# Patient Record
Sex: MALE | Race: WHITE | Employment: FULL TIME | URBAN - METROPOLITAN AREA
[De-identification: names, ages, dates, MRNs, and addresses within clinical notes are randomized per-mention and may not be internally consistent; named-entity substitution may affect disease eponyms.]

---

## 2022-11-19 ENCOUNTER — HOSPITAL ENCOUNTER (EMERGENCY)
Age: 32
Discharge: HOME | End: 2022-11-19
Attending: EMERGENCY MEDICINE
Payer: COMMERCIAL

## 2022-11-19 ENCOUNTER — APPOINTMENT (OUTPATIENT)
Dept: GENERAL RADIOLOGY | Age: 32
End: 2022-11-19
Payer: COMMERCIAL

## 2022-11-19 VITALS
WEIGHT: 180 LBS | OXYGEN SATURATION: 100 % | BODY MASS INDEX: 25.77 KG/M2 | SYSTOLIC BLOOD PRESSURE: 106 MMHG | DIASTOLIC BLOOD PRESSURE: 61 MMHG | HEART RATE: 51 BPM | RESPIRATION RATE: 14 BRPM | HEIGHT: 70 IN | TEMPERATURE: 97.7 F

## 2022-11-19 DIAGNOSIS — S39.012A STRAIN OF LUMBAR REGION, INITIAL ENCOUNTER: Primary | ICD-10-CM

## 2022-11-19 PROCEDURE — 6370000000 HC RX 637 (ALT 250 FOR IP): Performed by: EMERGENCY MEDICINE

## 2022-11-19 PROCEDURE — 72100 X-RAY EXAM L-S SPINE 2/3 VWS: CPT

## 2022-11-19 PROCEDURE — 96374 THER/PROPH/DIAG INJ IV PUSH: CPT

## 2022-11-19 PROCEDURE — 99284 EMERGENCY DEPT VISIT MOD MDM: CPT

## 2022-11-19 PROCEDURE — 6360000002 HC RX W HCPCS: Performed by: EMERGENCY MEDICINE

## 2022-11-19 RX ORDER — KETOROLAC TROMETHAMINE 30 MG/ML
30 INJECTION, SOLUTION INTRAMUSCULAR; INTRAVENOUS ONCE
Status: COMPLETED | OUTPATIENT
Start: 2022-11-19 | End: 2022-11-19

## 2022-11-19 RX ORDER — METHYLPREDNISOLONE 4 MG/1
TABLET ORAL
Qty: 1 KIT | Refills: 0 | Status: SHIPPED | OUTPATIENT
Start: 2022-11-19

## 2022-11-19 RX ORDER — IBUPROFEN 800 MG/1
800 TABLET ORAL EVERY 8 HOURS PRN
Qty: 21 TABLET | Refills: 0 | Status: SHIPPED | OUTPATIENT
Start: 2022-11-19 | End: 2022-11-26

## 2022-11-19 RX ORDER — CYCLOBENZAPRINE HCL 10 MG
10 TABLET ORAL ONCE
Status: COMPLETED | OUTPATIENT
Start: 2022-11-19 | End: 2022-11-19

## 2022-11-19 RX ORDER — LIDOCAINE 4 G/G
1 PATCH TOPICAL ONCE
Status: DISCONTINUED | OUTPATIENT
Start: 2022-11-19 | End: 2022-11-19 | Stop reason: HOSPADM

## 2022-11-19 RX ORDER — CYCLOBENZAPRINE HCL 10 MG
10 TABLET ORAL 3 TIMES DAILY PRN
Qty: 21 TABLET | Refills: 0 | Status: SHIPPED | OUTPATIENT
Start: 2022-11-19 | End: 2022-11-26

## 2022-11-19 RX ORDER — LIDOCAINE 50 MG/G
1 PATCH TOPICAL DAILY
Qty: 10 PATCH | Refills: 0 | Status: SHIPPED | OUTPATIENT
Start: 2022-11-19 | End: 2022-11-29

## 2022-11-19 RX ADMIN — CYCLOBENZAPRINE 10 MG: 10 TABLET, FILM COATED ORAL at 05:27

## 2022-11-19 RX ADMIN — KETOROLAC TROMETHAMINE 30 MG: 30 INJECTION, SOLUTION INTRAMUSCULAR; INTRAVENOUS at 05:27

## 2022-11-19 ASSESSMENT — PAIN SCALES - GENERAL
PAINLEVEL_OUTOF10: 5
PAINLEVEL_OUTOF10: 5

## 2022-11-19 ASSESSMENT — ENCOUNTER SYMPTOMS
BACK PAIN: 1
ABDOMINAL PAIN: 0

## 2022-11-19 ASSESSMENT — PAIN - FUNCTIONAL ASSESSMENT: PAIN_FUNCTIONAL_ASSESSMENT: 0-10

## 2022-11-19 NOTE — ED PROVIDER NOTES
Emergency Department Provider Note  Location: [address] [place] [address]  ED  11/19/2022     Patient Identification  Artemio Landaverde is a 27 y.o. male    Chief Complaint  Back Pain (Lower back pain \"forever\"  states wife made him come in because she is tired of listening to him groan. Pt has not ever taken anything for pain )      Mode of Arrival  private car    HPI  (History provided by patient)  This is a 27 y.o. male without relevant past medical history presented today for low back pain. Patient reports for several months, he has developed a low back pain. He described as a sharp pain in the lower thoracic to lower lumbar area that does not radiate. The pain is usually worse in the middle of the night or early morning when he first gets up. Once he gets up and move around, pain eases up. He has not taken any medicine for the pain. He denies fever. No dysuria urinary frequency or hematuria. No bowel or urinary retention or incontinence. No saddle paresthesia. Patient has not seen a healthcare provider for this. Wife got tired of listening to him groan in the middle of the night/early morning hours so she insisted that he gets evaluated tonight. ROS  Review of Systems   Constitutional:  Negative for chills and fever. Gastrointestinal:  Negative for abdominal pain. Genitourinary:  Negative for flank pain, frequency and hematuria. Musculoskeletal:  Positive for back pain. Negative for gait problem and neck pain. Neurological:  Negative for weakness. No bowel or bladder incontinence      I have reviewed the following nursing documentation:  Allergies: No Known Allergies    Past medical history: none reported    Past surgical history: none reported    Home medications: none reported    Social history: Patient works in construction    Family history:  No family history on file.     Exam  ED Triage Vitals [11/19/22 0508]   BP Temp Temp Source Heart Rate Resp SpO2 Height Weight   106/61 97.7 °F (36.5 °C) Oral 51 14 100 % 5' 10\" (1.778 m) 180 lb (81.6 kg)   Physical Exam  Vitals and nursing note reviewed. Constitutional:       General: He is not in acute distress. Appearance: Normal appearance. He is well-developed. He is not diaphoretic. HENT:      Head: Normocephalic and atraumatic. Eyes:      General: No scleral icterus. Right eye: No discharge. Left eye: No discharge. Neck:      Trachea: No tracheal deviation. Pulmonary:      Effort: Pulmonary effort is normal. No respiratory distress. Breath sounds: No stridor. Musculoskeletal:      Cervical back: No tenderness. Thoracic back: No tenderness. Lumbar back: Tenderness present. Right lower leg: No edema. Left lower leg: No edema. Skin:     General: Skin is warm and dry. Coloration: Skin is not pale. Neurological:      Mental Status: He is alert and oriented to person, place, and time. Cranial Nerves: No dysarthria or facial asymmetry. Sensory: No sensory deficit. Motor: No weakness, tremor or abnormal muscle tone. Coordination: Coordination normal.      Deep Tendon Reflexes:      Reflex Scores:       Patellar reflexes are 2+ on the right side and 2+ on the left side.   Psychiatric:         Mood and Affect: Mood normal.         Behavior: Behavior normal.         MDM/ED Course  ED Medication Orders (From admission, onward)      Start Ordered     Status Ordering Provider    11/19/22 0530 11/19/22 0522  ketorolac (TORADOL) injection 30 mg  ONCE         Last MAR action: Given - by David Thibodeaux on 11/19/22 [facility] 4960 [facility]     11/19/22 0530 11/19/22 0522  cyclobenzaprine (FLEXERIL) tablet 10 mg  ONCE         Last MAR action: Given - by David Thibodeaux on 11/19/22 [email or web address]     11/19/22 0530 11/19/22 0523  lidocaine 4 % external patch 1 patch  ONCE         Last MAR action: Patch Applied - by David Thibodeaux on 11/19/22 at 0546 [facility]  XR LUMBAR SPINE (2-3 VIEWS)    Result Date: 11/19/2022  EXAMINATION: 3 XRAY VIEWS OF THE LUMBAR SPINE 11/19/2022 5:37 am COMPARISON: None. HISTORY: ORDERING SYSTEM PROVIDED HISTORY: lumbar back pain TECHNOLOGIST PROVIDED HISTORY: Reason for exam:->lumbar back pain Reason for Exam: low back pain \"for a while\" with no known injury, pain non radiating FINDINGS: Lumbar vertebral bodies are normal in height and alignment. No evidence of fracture. Visualized sacrum is unremarkable. No significant degenerative changes. Unremarkable examination of the lumbar spine.        - Patient seen and evaluated in room 23.  27 y.o. male presented for LBP. There is no evidence for more malignant injury/pathology, such as, but not limited to, cauda equina syndrome, acute spinal cord pathology, spinal epidural abscess or mass, abdominal aortic aneurysm, or transverse myelitis. I completed a structured, evidence-based clinical evaluation to screen for acute non-traumatic spinal emergencies. The patient has no red flag symptoms and a normal detailed neurologic exam.  Patient does not have any urinary complaints or abdominal complaints. The evidence indicates that the patient is very low risk for an acute emergency and this is consistent with my clinical intuition. X-ray of the lumbar spine did not show acute abnormality. I believe it is in the patients best interest not to do additional emergent testing. Patient will be given medications. I believe the patient is a good candidate for outpatient symptomatic treatment. The patient was instructed on this treatment and the course that this type of back pain typically follows. I also discussed worrisome symptoms to monitor for at home including, but not limited to, numbness or weakness in the lower extremities, bowel or bladder dysfunction, and numbness in the groin. We discussed when symptoms are most concerning that necessitate immediate return.   Patient will be discharged with prescriptions, instructions for symptomatic treatment, monitoring and outpatient follow-up with PCP. Patient understands and agrees, return warnings given. - Is this patient to be included in the SEP-1 Core Measure due to severe sepsis or septic shock? No   Exclusion criteria - the patient is NOT to be included for SEP-1 Core Measure due to: Infection is not suspected        I estimate there is LOW risk for ABDOMINAL AORTIC ANEURYSM, CAUDA EQUINA SYNDROME, EPIDURAL MASS LESION, SPINAL STENOSIS, OR HERNIATED DISK CAUSING SEVERE STENOSIS, thus I consider the discharge disposition reasonable. Torey Perkins and I have discussed the diagnosis and risks, and we agree with discharging home to follow-up with their primary doctor. We also discussed returning to the Emergency Department immediately if new or worsening symptoms occur. We have discussed the symptoms which are most concerning (e.g., saddle anesthesia, urinary or bowel incontinence or retention, changing or worsening pain) that necessitate immediate return. Clinical Impression:  1. Strain of lumbar region, initial encounter      Disposition:  Discharge to home in good condition. Blood pressure 106/61, pulse 51, temperature 97.7 °F (36.5 °C), temperature source Oral, resp. rate 14, height 5' 10\" (1.778 m), weight 180 lb (81.6 kg), SpO2 100 %. Patient was given scripts for the following medications. I counseled patient how to take these medications. Discharge Medication List as of 11/19/2022  6:00 AM        START taking these medications    Details   cyclobenzaprine (FLEXERIL) 10 MG tablet Take 1 tablet by mouth 3 times daily as needed for Muscle spasms, Disp-21 tablet, R-0Normal      ibuprofen (ADVIL;MOTRIN) 800 MG tablet Take 1 tablet by mouth every 8 hours as needed for Pain, Disp-21 tablet, R-0Normal      lidocaine (LIDODERM) 5 % Place 1 patch onto the skin daily for 10 days 12 hours on, 12 hours off.   Pharmacy: if insurance will not cover 5% patch, ok to switch to 4%, Disp-10 patch, R-0Normal      methylPREDNISolone (MEDROL DOSEPACK) 4 MG tablet Take by mouth. Follow direction on the kit, Disp-1 kit, R-0Normal             Disposition referral (if applicable):  [facility] ([facility]) Pre-Services  [phone]          Total critical care time is 0 minutes, which excludes separately billable procedures and updating family. Time spent is specifically for management of the presenting complaint and symptoms initially, direct bedside care, reevaluation, review of records, and consultation. There was a high probability of clinically significant life-threatening deterioration in the patient's condition, which required my urgent intervention. This chart was generated in part by using Dragon Dictation system and may contain errors related to that system including errors in grammar, punctuation, and spelling, as well as words and phrases that may be inappropriate. If there are any questions or concerns please feel free to contact the dictating provider for clarification.      Oskar Flores MD  [address] [facility] Gala Mckeon MD  11/19/22 8070

## 2022-11-19 NOTE — ED NOTES
Pt c/o mid lumbar back pain \"forever\". Pt states it goes down his legs but states it's even. Pt denies loss of bowel or bladder. Pt medicated per orders and ambulated to xray.       Bebe Conrad RN  11/19/22 1077

## 2022-11-19 NOTE — Clinical Note
Nataly Peres was seen and treated in our emergency department on 11/19/2022. He may return to work on 11/28/2022. If you have any questions or concerns, please don't hesitate to call.       Mitali Yu MD

## 2022-12-29 ENCOUNTER — APPOINTMENT (OUTPATIENT)
Dept: CT IMAGING | Age: 32
End: 2022-12-29
Payer: COMMERCIAL

## 2022-12-29 ENCOUNTER — HOSPITAL ENCOUNTER (EMERGENCY)
Age: 32
Discharge: HOME | End: 2022-12-29
Payer: COMMERCIAL

## 2022-12-29 VITALS
HEART RATE: 58 BPM | OXYGEN SATURATION: 98 % | SYSTOLIC BLOOD PRESSURE: 103 MMHG | WEIGHT: 175 LBS | DIASTOLIC BLOOD PRESSURE: 68 MMHG | BODY MASS INDEX: 25.92 KG/M2 | HEIGHT: 69 IN | TEMPERATURE: 98.1 F | RESPIRATION RATE: 16 BRPM

## 2022-12-29 DIAGNOSIS — S80.812A ABRASION OF LEFT LOWER EXTREMITY, INITIAL ENCOUNTER: Primary | ICD-10-CM

## 2022-12-29 LAB
ANION GAP SERPL CALCULATED.3IONS-SCNC: 11 MMOL/L (ref 3–16)
BASOPHILS ABSOLUTE: 0.1 K/UL (ref 0–0.2)
BASOPHILS RELATIVE PERCENT: 0.5 %
BUN BLDV-MCNC: 13 MG/DL (ref 7–20)
CALCIUM SERPL-MCNC: 9.1 MG/DL (ref 8.3–10.6)
CHLORIDE BLD-SCNC: 102 MMOL/L (ref 99–110)
CO2: 29 MMOL/L (ref 21–32)
CREAT SERPL-MCNC: 0.7 MG/DL (ref 0.9–1.3)
EOSINOPHILS ABSOLUTE: 0.5 K/UL (ref 0–0.6)
EOSINOPHILS RELATIVE PERCENT: 3.1 %
GFR SERPL CREATININE-BSD FRML MDRD: >60 ML/MIN/{1.73_M2}
GLUCOSE BLD-MCNC: 101 MG/DL (ref 70–99)
HCT VFR BLD CALC: 40.2 % (ref 40.5–52.5)
HEMOGLOBIN: 13.9 G/DL (ref 13.5–17.5)
LYMPHOCYTES ABSOLUTE: 2.5 K/UL (ref 1–5.1)
LYMPHOCYTES RELATIVE PERCENT: 16.8 %
MCH RBC QN AUTO: 29 PG (ref 26–34)
MCHC RBC AUTO-ENTMCNC: 34.5 G/DL (ref 31–36)
MCV RBC AUTO: 84 FL (ref 80–100)
MONOCYTES ABSOLUTE: 0.8 K/UL (ref 0–1.3)
MONOCYTES RELATIVE PERCENT: 5.3 %
NEUTROPHILS ABSOLUTE: 11.2 K/UL (ref 1.7–7.7)
NEUTROPHILS RELATIVE PERCENT: 74.3 %
PDW BLD-RTO: 13 % (ref 12.4–15.4)
PLATELET # BLD: 301 K/UL (ref 135–450)
PMV BLD AUTO: 7.9 FL (ref 5–10.5)
POTASSIUM REFLEX MAGNESIUM: 3.9 MMOL/L (ref 3.5–5.1)
RBC # BLD: 4.79 M/UL (ref 4.2–5.9)
SODIUM BLD-SCNC: 142 MMOL/L (ref 136–145)
WBC # BLD: 15.1 K/UL (ref 4–11)

## 2022-12-29 PROCEDURE — 80048 BASIC METABOLIC PNL TOTAL CA: CPT

## 2022-12-29 PROCEDURE — 99285 EMERGENCY DEPT VISIT HI MDM: CPT

## 2022-12-29 PROCEDURE — 36415 COLL VENOUS BLD VENIPUNCTURE: CPT

## 2022-12-29 PROCEDURE — 85025 COMPLETE CBC W/AUTO DIFF WBC: CPT

## 2022-12-29 PROCEDURE — 74177 CT ABD & PELVIS W/CONTRAST: CPT

## 2022-12-29 PROCEDURE — 6360000004 HC RX CONTRAST MEDICATION: Performed by: NURSE PRACTITIONER

## 2022-12-29 RX ADMIN — IOPAMIDOL 75 ML: 755 INJECTION, SOLUTION INTRAVENOUS at 21:57

## 2022-12-29 ASSESSMENT — PAIN - FUNCTIONAL ASSESSMENT
PAIN_FUNCTIONAL_ASSESSMENT: 0-10
PAIN_FUNCTIONAL_ASSESSMENT: NONE - DENIES PAIN

## 2022-12-29 ASSESSMENT — PAIN DESCRIPTION - PAIN TYPE: TYPE: ACUTE PAIN

## 2022-12-29 ASSESSMENT — PAIN DESCRIPTION - ONSET: ONSET: ON-GOING

## 2022-12-29 ASSESSMENT — PAIN DESCRIPTION - DESCRIPTORS: DESCRIPTORS: SORE

## 2022-12-29 ASSESSMENT — PAIN SCALES - GENERAL
PAINLEVEL_OUTOF10: 0
PAINLEVEL_OUTOF10: 2

## 2022-12-29 ASSESSMENT — PAIN DESCRIPTION - LOCATION: LOCATION: LEG;GROIN

## 2022-12-29 ASSESSMENT — PAIN DESCRIPTION - FREQUENCY: FREQUENCY: CONTINUOUS

## 2022-12-29 NOTE — Clinical Note
Calvin Runmonica was seen and treated in our emergency department on 12/29/2022. He may return to work on 12/31/2022. If you have any questions or concerns, please don't hesitate to call.       KARLA Watts - CNP

## 2022-12-31 NOTE — ED PROVIDER NOTES
[address] ED  EMERGENCY DEPARTMENT ENCOUNTER        Pt Name: Chetna English  MRN: [id number]  [place] 1990  Date of evaluation: 12/29/2022  Provider: KARLA Martinez CNP  PCP: No primary care provider on file. Note Started: 6:47 AM EST12/31/2022       KATY. I have evaluated this patient. My supervising physician was available for consultation. Triage CHIEF COMPLAINT       Chief Complaint   Patient presents with    Fall     Pt remodeling house and floor is pulled up. Pt fell and hit left shin and is having right groin pain. Denies hitting head, no LOC, not on blood thinners. HISTORY OF PRESENT ILLNESS   (Location/Symptom, Timing/Onset, Context/Setting, Quality, Duration, Modifying Factors, Severity)  Note limiting factors. Chief Complaint: Fall through floor with abrasion involving the left shin and lower abdominal pain    Chetna English is a 27 y.o. male who presents to the emergency department symptoms of lower abdominal pain and abrasion to left shin after he fell through floor. Patient states that he was remodeling apparently at following through a floor and had a small abrasion to the shin and injured his lower abdomen. Denies any scrotal, testicular, penile pain. No bony pelvic pain. No hip pain. No knee pain. Wound to his shin is well approximated without laceration with hemostasis. Wound does not appear to be grossly contaminated. Nursing Notes were all reviewed and agreed with or any disagreements were addressed in the HPI. REVIEW OF SYSTEMS    (2-9 systems for level 4, 10 or more for level 5)     Review of Systems   Constitutional:  Negative for chills, diaphoresis and fever. HENT:  Negative for congestion, ear pain, rhinorrhea and sore throat. Eyes:  Negative for pain and visual disturbance. Respiratory:  Negative for cough and shortness of breath. Cardiovascular:  Negative for chest pain and leg swelling. Gastrointestinal:  Positive for abdominal pain. Negative for blood in stool, diarrhea, nausea and vomiting. Genitourinary:  Negative for difficulty urinating, dysuria, flank pain and frequency. Musculoskeletal:  Positive for myalgias. Negative for back pain and neck pain. Skin:  Negative for rash and wound. Abrasion to his left shin   Neurological:  Negative for dizziness and light-headedness. PAST MEDICAL HISTORY   History reviewed. No pertinent past medical history. SURGICAL HISTORY   History reviewed. No pertinent surgical history. [address]       Discharge Medication List as of 12/29/2022 11:03 PM        CONTINUE these medications which have NOT CHANGED    Details   ibuprofen (ADVIL;MOTRIN) 800 MG tablet Take 1 tablet by mouth every 8 hours as needed for Pain, Disp-21 tablet, R-0Normal      methylPREDNISolone (MEDROL DOSEPACK) 4 MG tablet Take by mouth. Follow direction on the kit, Disp-1 kit, R-0Normal             ALLERGIES     Amoxicillin    FAMILYHISTORY     History reviewed. No pertinent family history.      SOCIAL HISTORY       Social History     Socioeconomic History    Marital status:      Spouse name: None    Number of children: None    Years of education: None    Highest education level: None   Tobacco Use    Smoking status: Former     Types: Cigarettes    Smokeless tobacco: Former     Types: Chew   Vaping Use    Vaping Use: Some days   Substance and Sexual Activity    Alcohol use: Yes     Comment: occ    Drug use: Yes     Types: Marijuana (Weed)       SCREENINGS        Radcliffe Coma Scale  Eye Opening: Spontaneous  Best Verbal Response: Oriented  Best Motor Response: Obeys commands  Doni Coma Scale Score: 15                CIWA Assessment  BP: 103/68  Heart Rate: 58             PHYSICAL EXAM    (up to 7 for level 4, 8 or more for level 5)     ED Triage Vitals [12/29/22 1924]   BP Temp Temp Source Heart Rate Resp SpO2 Height Weight   113/77 98.1 °F (36.7 °C) Oral 63 16 98 % 5' 9\" (1.753 m) 175 lb (79.4 kg)       Physical Exam  Vitals and nursing note reviewed. Constitutional:       Appearance: Normal appearance. He is not toxic-appearing or diaphoretic. HENT:      Head: Normocephalic and atraumatic. Nose: Nose normal.   Eyes:      General:         Right eye: No discharge. Left eye: No discharge. Cardiovascular:      Rate and Rhythm: Normal rate and regular rhythm. Pulses: Normal pulses. Pulmonary:      Effort: Pulmonary effort is normal. No respiratory distress. Breath sounds: Normal breath sounds. No wheezing or rhonchi. Abdominal:      Palpations: Abdomen is soft. Tenderness: There is no abdominal tenderness. There is no guarding or rebound. Musculoskeletal:         General: Normal range of motion. Cervical back: Normal range of motion and neck supple. Skin:     General: Skin is warm and dry. Comments: Abrasion to the left shin. No knee pain no ankle pain. Hemostasis noted. Abrasion approximately 1 cm x 1 cm. Neurological:      General: No focal deficit present. Mental Status: He is alert and oriented to person, place, and time. Psychiatric:         Mood and Affect: Mood normal.         Behavior: Behavior normal.       DIAGNOSTIC RESULTS   LABS:    Labs Reviewed   CBC WITH AUTO DIFFERENTIAL - Abnormal; Notable for the following components:       Result Value    WBC 15.1 (*)     Hematocrit 40.2 (*)     Neutrophils Absolute 11.2 (*)     All other components within normal limits   BASIC METABOLIC PANEL W/ REFLEX TO MG FOR LOW K - Abnormal; Notable for the following components:    Glucose 101 (*)     Creatinine 0.7 (*)     All other components within normal limits       When ordered, only abnormal lab results are displayed. All other labs were within normal range or not returned as of this dictation. EKG:  When ordered, EKG's are interpreted by the Emergency Department Physician in the absence of a cardiologist.  Please see their note for interpretation of EKG. RADIOLOGY:   Non-plain film images such as CT, Ultrasound and MRI are read by the radiologist. Plain radiographic images are visualized and preliminarily interpreted by the  ED Provider with the below findings:        Interpretation perthe Radiologist below, if available at the time of this note:    CT ABDOMEN PELVIS W IV CONTRAST Additional Contrast? None   Final Result   1. No acute traumatic fracture or intra-abdominal/pelvic hemorrhage. 2. Bilateral symmetric sacroiliitis. 3. Hepatomegaly. No splenomegaly. CT ABDOMEN PELVIS W IV CONTRAST Additional Contrast? None    Result Date: 12/29/2022  EXAMINATION: CT OF THE ABDOMEN AND PELVIS WITH CONTRAST 12/29/2022 9:57 pm TECHNIQUE: CT of the abdomen and pelvis was performed with the administration of intravenous contrast. Multiplanar reformatted images are provided for review. Automated exposure control, iterative reconstruction, and/or weight based adjustment of the mA/kV was utilized to reduce the radiation dose to as low as reasonably achievable. COMPARISON: None. HISTORY: ORDERING SYSTEM PROVIDED HISTORY: lower abdominal pain after fall through floor. Injured pelvis TECHNOLOGIST PROVIDED HISTORY: Reason for exam:->lower abdominal pain after fall through floor. Injured pelvis Additional Contrast?->None Decision Support Exception - unselect if not a suspected or confirmed emergency medical condition->Emergency Medical Condition (MA) Reason for Exam: lower abdominal pain after fall through floor. Injured pelvis FINDINGS: Lower Chest: No consolidation or pleural effusion is identified. No pericardial effusion. No distal esophageal thickening. Organs: No enhancing or hypodense mass identified in the liver or spleen. Hepatomegaly. Calcified splenic granulomas. No adrenal mass. No pancreatic mass. No peripancreatic inflammatory change. Gallbladder is unremarkable.  No enhancing renal mass.  No renal calculi. No hydroureteronephrosis. No ureteral calculi. GI/Bowel: No ileus or obstruction. Normal appendix. No acute inflammatory bowel process is identified. Pelvis: No pelvic mass. Bladder appears unremarkable. No free fluid in the pelvis. No pelvic lymphadenopathy is identified. Peritoneum/Retroperitoneum: No abdominal aortic aneurysm. No dissection. No retroperitoneal or mesenteric lymphadenopathy is identified. Bones/Soft Tissues: No acute subcutaneous soft tissue abnormality is identified. Bilateral sacroiliitis. No traumatic pelvic fracture. 1. No acute traumatic fracture or intra-abdominal/pelvic hemorrhage. 2. Bilateral symmetric sacroiliitis. 3. Hepatomegaly. No splenomegaly. No results found. PROCEDURES   Unless otherwise noted below, none     Procedures    CRITICAL CARE TIME   N/A    CONSULTS:  None      EMERGENCY DEPARTMENT COURSE and DIFFERENTIAL DIAGNOSIS/MDM:   Vitals:    Vitals:    12/29/22 1924 12/29/22 2234   BP: 113/77 103/68   Pulse: 63 58   Resp: 16 16   Temp: 98.1 °F (36.7 °C)    TempSrc: Oral    SpO2: 98% 98%   Weight: 175 lb (79.4 kg)    Height: 5' 9\" (1.753 m)        Patient was given the following medications:  Medications   iopamidol (ISOVUE-370) 76 % injection 75 mL (75 mLs IntraVENous Given 12/29/22 2157)         Is this patient to be included in the SEP-1 Core Measure due to severe sepsis or septic shock? No   Exclusion criteria - the patient is NOT to be included for SEP-1 Core Measure due to:  2+ SIRS criteria are not met    Patient CT scan of the abdomen pelvis was unremarkable. Scrotal testicular, penile exam was without injury. Without pain. States has been able to urinate without any difficulty or blood. States that his abrasion to his shin very superficial.  After reviewing the abrasion does not appear to need closure and I believe can heal by secondary intention. Patient states his tetanus was updated 2 years ago.   Patient is ambulatory out difficulty. Low suspicion for foreign body, acute fracture, or further acute etiology involving the abrasion. Patient was encouraged to follow with him doctor next few days. Also advised return back to the ER for any further acute concerns    I am the Primary Clinician of Record. FINAL IMPRESSION      1.  Abrasion of left lower extremity, initial encounter          DISPOSITION/PLAN   DISPOSITION Decision To Discharge 12/29/2022 10:52:11 PM      PATIENT [facility]EDTO:  [facility]  [address] [facility]  [phone]  Go to   If symptoms worsen    [facility][note 3]  [phone]          DISCHARGE MEDICATIONS:  Discharge Medication List as of 12/29/2022 11:03 PM          DISCONTINUED MEDICATIONS:  Discharge Medication List as of 12/29/2022 11:03 PM                 (Please note that portions ofthis note were completed with a voice recognition program.  Efforts were made to edit the dictations but occasionally words are mis-transcribed.)    KARLA Ricci CNP (electronically signed)             KARLA Ricci CNP  12/31/22 5475